# Patient Record
Sex: MALE
[De-identification: names, ages, dates, MRNs, and addresses within clinical notes are randomized per-mention and may not be internally consistent; named-entity substitution may affect disease eponyms.]

---

## 2018-05-14 ENCOUNTER — HOSPITAL ENCOUNTER (OUTPATIENT)
Dept: HOSPITAL 42 - SDS | Age: 63
Discharge: HOME | End: 2018-05-14
Attending: SURGERY
Payer: MEDICARE

## 2018-05-14 VITALS
RESPIRATION RATE: 18 BRPM | HEART RATE: 58 BPM | SYSTOLIC BLOOD PRESSURE: 127 MMHG | TEMPERATURE: 97.8 F | DIASTOLIC BLOOD PRESSURE: 71 MMHG

## 2018-05-14 VITALS — BODY MASS INDEX: 25.9 KG/M2

## 2018-05-14 VITALS — OXYGEN SATURATION: 97 %

## 2018-05-14 DIAGNOSIS — C44.519: Primary | ICD-10-CM

## 2018-05-14 DIAGNOSIS — C44.712: ICD-10-CM

## 2018-05-14 DIAGNOSIS — C44.619: ICD-10-CM

## 2018-05-14 LAB
APTT BLD: 29.2 SECONDS (ref 25.1–36.5)
INR PPP: 1.07 (ref 0.93–1.08)
PROTHROMBIN TIME: 12.3 SECONDS (ref 9.4–12.5)

## 2018-05-14 PROCEDURE — 85610 PROTHROMBIN TIME: CPT

## 2018-05-14 PROCEDURE — 88305 TISSUE EXAM BY PATHOLOGIST: CPT

## 2018-05-14 PROCEDURE — 36415 COLL VENOUS BLD VENIPUNCTURE: CPT

## 2018-05-14 PROCEDURE — 14000 TIS TRNFR TRUNK 10 SQ CM/<: CPT

## 2018-05-14 PROCEDURE — 85730 THROMBOPLASTIN TIME PARTIAL: CPT

## 2018-05-14 PROCEDURE — 11100: CPT

## 2018-05-14 PROCEDURE — 88309 TISSUE EXAM BY PATHOLOGIST: CPT

## 2018-05-14 PROCEDURE — 97116 GAIT TRAINING THERAPY: CPT

## 2018-05-14 NOTE — PCM.[id number]
Surgeon's Initial Post Op Note





- Surgeon's Notes


Surgeon: Dr. Chaudhary


Assistant: Juaquin PGY2, Suzanna PGY2


Type of Anesthesia: IV Sedation, Local


Anesthesia Administered By: Dr. AZALIA Harper


Pre-Operative Diagnosis: Skin lesions of the right chest, right calf, and left 

upper arm.


Operative Findings: see operative report


Post-Operative Diagnosis: same


Operation Performed: Excision of right chest wall lesion, right calf lesion. 

Punch biopsy of left upper arm lesion


Specimen/Specimens Removed: Right chest wall skin lesion (Right lateral border 

marked with suture). Right calf skin lesion (Right lateral border marked with 

suture). Left Upper arm skin lesion


Estimated Blood Loss: EBL {In ML}: 5


Blood Products Given: N/A


Drains Used: No Drains


Post-Op Condition: Good


Date of Surgery/Procedure: 05/14/18


Time of Surgery/Procedure: 12:34

## 2018-05-15 NOTE — OP
PROCEDURE DATE:  05/14/2018



PREOPERATIVE DIAGNOSIS:  Basel cell carcinoma of the chest and right leg,

and possible of the left arm.



OPERATION PERFORMED:

1.  Wide and deep excision with advancement flaps of the chest wall.

2.  Wide and deep excision of the right leg lesion with advancement flaps

and core biopsy of the lesion on the left arm.



DESCRIPTION OF PROCEDURE:  In the operating room, the patient was

identified by name, name of procedure, and laterality.  Simultaneously, the

chest, right leg, and left thigh  were prepped and draped using Betadine as

there was an open lesion on the chest.  Serially, starting with the chest

wall, the area was mapped out with a margin, an ellipse was made; having

been injected with Marcaine and Xylocaine, the ellipse was taken down to

the fascia and removed.  There was bleeding in the superior part of the

flap, which required suture ligation; otherwise, cautery was done.  Flaps

were raised superiorly and inferiorly with the _____ .  The incision was

then closed with a central tensioning stitch followed by subcutaneous

Vicryl and a subcuticular PDS above and below.  It was a beautiful closure.

The specimen was sent with a tag on the lateral side and marked

appropriately.  The right leg was then dealt with an equal manner.  The

ellipse was made and designed, the skin was less mobile, required larger

flaps superiorly and inferiorly.  A tensioning stitch was placed and it was

elected to close this with mattress stitches as there was some tension. 

Incision was closed  _____ mattress stitches of Prolene.  Light pressure

dressing was applied including an Ace bandage.  The specimen was sent off

and marked with a stitch on the lateral edge.  These were dressed.  The

left arm was then approached.  It was prepped and draped as before, and it

was biopsied with a cookie cutter.  The specimen was sent off.  The

incision was closed with Vicryl.  Light dressing was applied.  The patient

was taken to recovery room in good condition after sponge and needle counts

were declared correct.





__________________________________________

Roger Chaudhary MD



DD:  05/14/2018 13:25:55

DT:  05/14/2018 21:40:48

Job # [id number]